# Patient Record
Sex: MALE | Race: BLACK OR AFRICAN AMERICAN | NOT HISPANIC OR LATINO | URBAN - METROPOLITAN AREA
[De-identification: names, ages, dates, MRNs, and addresses within clinical notes are randomized per-mention and may not be internally consistent; named-entity substitution may affect disease eponyms.]

---

## 2022-04-30 ENCOUNTER — TELEPHONE ENCOUNTER (OUTPATIENT)
Dept: URBAN - METROPOLITAN AREA CLINIC 121 | Facility: CLINIC | Age: 52
End: 2022-04-30

## 2022-05-01 ENCOUNTER — TELEPHONE ENCOUNTER (OUTPATIENT)
Dept: URBAN - METROPOLITAN AREA CLINIC 121 | Facility: CLINIC | Age: 52
End: 2022-05-01

## 2022-05-01 RX ORDER — FAMOTIDINE 10 MG
TABLET ORAL
Status: ACTIVE | COMMUNITY
Start: 2014-06-03

## 2022-07-09 ENCOUNTER — TELEPHONE ENCOUNTER (OUTPATIENT)
Dept: URBAN - METROPOLITAN AREA CLINIC 121 | Facility: CLINIC | Age: 52
End: 2022-07-09

## 2022-07-09 RX ORDER — MELOXICAM 5 MG/1
CAPSULE ORAL AS NEEDED
Refills: 0 | OUTPATIENT
Start: 2019-11-07 | End: 2019-12-11

## 2022-07-10 ENCOUNTER — TELEPHONE ENCOUNTER (OUTPATIENT)
Dept: URBAN - METROPOLITAN AREA CLINIC 121 | Facility: CLINIC | Age: 52
End: 2022-07-10

## 2022-07-10 RX ORDER — HYDROCORTISONE ACETATE 25 MG/1
BID PRN BLEEDING AND RECTAL PAIN SUPPOSITORY RECTAL
Refills: 5 | Status: ACTIVE | COMMUNITY
Start: 2019-11-14

## 2022-07-10 RX ORDER — NITROGLYCERIN 4 MG/G
APPLY BID OINTMENT RECTAL
Refills: 2 | Status: ACTIVE | COMMUNITY
Start: 2019-11-07

## 2022-07-10 RX ORDER — MELOXICAM 5 MG/1
CAPSULE ORAL AS NEEDED
Refills: 0 | Status: ACTIVE | COMMUNITY
Start: 2019-12-11

## 2023-11-29 ENCOUNTER — TELEPHONE ENCOUNTER (OUTPATIENT)
Dept: URBAN - METROPOLITAN AREA CLINIC 63 | Facility: CLINIC | Age: 53
End: 2023-11-29

## 2024-01-09 ENCOUNTER — OFFICE VISIT (OUTPATIENT)
Dept: URBAN - METROPOLITAN AREA CLINIC 63 | Facility: CLINIC | Age: 54
End: 2024-01-09
Payer: OTHER GOVERNMENT

## 2024-01-09 ENCOUNTER — LAB OUTSIDE AN ENCOUNTER (OUTPATIENT)
Dept: URBAN - METROPOLITAN AREA CLINIC 63 | Facility: CLINIC | Age: 54
End: 2024-01-09

## 2024-01-09 VITALS
BODY MASS INDEX: 20.44 KG/M2 | SYSTOLIC BLOOD PRESSURE: 102 MMHG | HEART RATE: 77 BPM | DIASTOLIC BLOOD PRESSURE: 64 MMHG | HEIGHT: 69 IN | TEMPERATURE: 97.5 F | OXYGEN SATURATION: 98 % | WEIGHT: 138 LBS

## 2024-01-09 DIAGNOSIS — Z12.11 COLON CANCER SCREENING: ICD-10-CM

## 2024-01-09 PROCEDURE — 99214 OFFICE O/P EST MOD 30 MIN: CPT | Performed by: INTERNAL MEDICINE

## 2024-01-09 RX ORDER — ACETAMINOPHEN 325 MG/1
1 TABLET AS NEEDED TABLET ORAL
Status: ACTIVE | COMMUNITY

## 2024-01-09 NOTE — HPI-TODAY'S VISIT:
This patient is seen in the office for screening colonoscopy.  He is 53 years old his last colonoscopy according to our records was in 2014.  That showed internal hemorrhoids There is some paperwork in the chart making mention of colon cancer.  The patient tells me that he does not have colon cancer and has never had colon cancer.  He has never had any surgery for this Currently he has no GI symptoms.  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  He says he has seen several neurologist about this and they have told him that there is "nothing to do" He has no respiratory symptoms no chest pain no shortness of breath [facility]

## 2024-02-21 ENCOUNTER — COLON (OUTPATIENT)
Dept: URBAN - METROPOLITAN AREA SURGERY CENTER 4 | Facility: SURGERY CENTER | Age: 54
End: 2024-02-21
Payer: OTHER GOVERNMENT

## 2024-02-21 DIAGNOSIS — Z12.11 ENCOUNTER FOR SCREENING FOR MALIGNANT NEOPLASM OF COLON: ICD-10-CM

## 2024-02-21 DIAGNOSIS — K64.8 OTHER HEMORRHOIDS: ICD-10-CM

## 2024-02-21 PROCEDURE — G0121 COLON CA SCRN NOT HI RSK IND: HCPCS | Performed by: INTERNAL MEDICINE

## 2024-02-21 RX ORDER — ACETAMINOPHEN 325 MG/1
1 TABLET AS NEEDED TABLET ORAL
Status: ACTIVE | COMMUNITY

## 2024-03-19 ENCOUNTER — OV EP (OUTPATIENT)
Dept: URBAN - METROPOLITAN AREA CLINIC 63 | Facility: CLINIC | Age: 54
End: 2024-03-19
Payer: OTHER GOVERNMENT

## 2024-03-19 VITALS
BODY MASS INDEX: 19.52 KG/M2 | HEIGHT: 69 IN | TEMPERATURE: 97 F | OXYGEN SATURATION: 99 % | SYSTOLIC BLOOD PRESSURE: 110 MMHG | DIASTOLIC BLOOD PRESSURE: 60 MMHG | WEIGHT: 131.8 LBS | HEART RATE: 85 BPM

## 2024-03-19 DIAGNOSIS — Z12.11 COLON CANCER SCREENING: ICD-10-CM

## 2024-03-19 PROCEDURE — 99202 OFFICE O/P NEW SF 15 MIN: CPT | Performed by: PHYSICIAN ASSISTANT

## 2024-03-19 RX ORDER — ACETAMINOPHEN 325 MG/1
1 TABLET AS NEEDED TABLET ORAL
Status: ACTIVE | COMMUNITY

## 2024-03-19 NOTE — HPI-TODAY'S VISIT:
53 years old male with history of muscular dystrophy presents to the office for follow-up after screening colonoscopy which was done on February 21st 2024.  His colonoscopy was normal to the cecum with the exception of internal hemorrhoids.  No specimens were collected.  Repeat colonoscopy was recommended in 10 years. He follows up doing well.  He had no problems following his procedure.  He has no GI complaints. He has no family history of colon cancer.